# Patient Record
Sex: MALE | Race: WHITE | Employment: OTHER | URBAN - METROPOLITAN AREA
[De-identification: names, ages, dates, MRNs, and addresses within clinical notes are randomized per-mention and may not be internally consistent; named-entity substitution may affect disease eponyms.]

---

## 2020-07-17 ENCOUNTER — HOSPITAL ENCOUNTER (EMERGENCY)
Age: 85
Discharge: HOME | End: 2020-07-17
Attending: EMERGENCY MEDICINE
Payer: MEDICARE

## 2020-07-17 VITALS
OXYGEN SATURATION: 96 % | HEART RATE: 74 BPM | WEIGHT: 195 LBS | RESPIRATION RATE: 22 BRPM | TEMPERATURE: 97.9 F | SYSTOLIC BLOOD PRESSURE: 160 MMHG | DIASTOLIC BLOOD PRESSURE: 90 MMHG | HEIGHT: 68 IN | BODY MASS INDEX: 29.55 KG/M2

## 2020-07-17 DIAGNOSIS — R31.0 GROSS HEMATURIA: Primary | ICD-10-CM

## 2020-07-17 DIAGNOSIS — N30.90 CYSTITIS: ICD-10-CM

## 2020-07-17 LAB
APPEARANCE UR: ABNORMAL
BACTERIA URNS QL MICRO: ABNORMAL /HPF
BILIRUB UR QL: ABNORMAL
CASTS URNS QL MICRO: ABNORMAL /LPF
COLOR UR: ABNORMAL
EPI CELLS #/AREA URNS HPF: ABNORMAL /HPF
GLUCOSE UR STRIP.AUTO-MCNC: NEGATIVE MG/DL
HGB UR QL STRIP: ABNORMAL
KETONES UR QL STRIP.AUTO: 40 MG/DL
LEUKOCYTE ESTERASE UR QL STRIP.AUTO: ABNORMAL
NITRITE UR QL STRIP.AUTO: POSITIVE
PH UR STRIP: 5 [PH] (ref 5–9)
PROT UR STRIP-MCNC: 300 MG/DL
RBC #/AREA URNS HPF: >100 /HPF
SP GR UR REFRACTOMETRY: 1.02 (ref 1–1.02)
UROBILINOGEN UR QL STRIP.AUTO: 1 EU/DL (ref 0.2–1)
WBC URNS QL MICRO: ABNORMAL /HPF

## 2020-07-17 PROCEDURE — 99283 EMERGENCY DEPT VISIT LOW MDM: CPT

## 2020-07-17 PROCEDURE — 74011250637 HC RX REV CODE- 250/637: Performed by: EMERGENCY MEDICINE

## 2020-07-17 PROCEDURE — 81001 URINALYSIS AUTO W/SCOPE: CPT

## 2020-07-17 RX ORDER — CEFPODOXIME PROXETIL 200 MG/1
200 TABLET, FILM COATED ORAL EVERY 12 HOURS
Status: DISCONTINUED | OUTPATIENT
Start: 2020-07-17 | End: 2020-07-17 | Stop reason: HOSPADM

## 2020-07-17 RX ORDER — CEFPODOXIME PROXETIL 100 MG/1
100 TABLET, FILM COATED ORAL 2 TIMES DAILY
Qty: 20 TAB | Refills: 0 | Status: SHIPPED | OUTPATIENT
Start: 2020-07-17 | End: 2020-07-27

## 2020-07-17 RX ORDER — LIDOCAINE HYDROCHLORIDE 20 MG/ML
JELLY TOPICAL ONCE
Status: DISCONTINUED | OUTPATIENT
Start: 2020-07-17 | End: 2020-07-17 | Stop reason: HOSPADM

## 2020-07-17 RX ORDER — LIDOCAINE HYDROCHLORIDE 20 MG/ML
JELLY TOPICAL
Status: DISCONTINUED
Start: 2020-07-17 | End: 2020-07-17 | Stop reason: HOSPADM

## 2020-07-17 RX ADMIN — CEFPODOXIME PROXETIL 200 MG: 200 TABLET, FILM COATED ORAL at 18:48

## 2020-07-17 NOTE — Clinical Note
Melva Galindo was seen and treated in our emergency department on 7/17/2020. He may return to work on .

## 2020-07-17 NOTE — ED TRIAGE NOTES
Patient arrives with complaint of penile pain with bleeding. This started two hours ago when he noticed blood in his urine. He states that the pain started at that time as well. Patient has had prostate problems in the past that were treated with antibiotics, but he states that this has never happed before. He reports also having a penile implant.

## 2020-07-17 NOTE — ED PROVIDER NOTES
[facility] Emergency Department  Arrival Date/Time: 7/17/2020 @ 30 Cici Sherman  MRN: [id number]      65 y.o. male    YOB: 1935   Telephone Information:   Mobile [phone] (home)     [facility] EMERGENCY DEPT FT11/11  Seen on 7/17/2020 @ 6:01 PM       Chief Complaint   Patient presents with    Penis Pain     HPI: 63-year-old male history of prostate problems and penile implant presents to the emergency department with hematuria    Patient states he first noticed it a few hours ago. Is happened several times since onset    No pain. No suprapubic discomfort or distention. He is able to urinate without difficulty    No fever no chills. He is not tachycardic. Blood pressure slightly elevated at 163/93 but not alarmingly so       HPI    Historian: patient    Review of Systems: .addros     . zaddrostable   No fever  Const  nontoxic well-appearing   Resp  no cough or shortness of breath   CV  no palpitations   GI  no nausea or vomiting     see HPI   Skin  warm dry     Allergies: Allergies   Allergen Reactions    Sulfa (Sulfonamide Antibiotics) Other (comments)     \"puts me out\"         Key Anti-Platelet Anticoagulant Meds     The patient is on no antiplatelet meds or anticoagulants. Physical Exam:  Nursing documentation reviewed. Vitals:    07/17/20 1730 07/17/20 1733   BP: 163/69 (!) 163/93   Pulse:  72   Resp:  24   Temp:  97.9 °F (36.6 °C)   SpO2:  96%    Vital signs were reviewed. Physical Exam  Vitals signs and nursing note reviewed. Constitutional:       General: He is not in acute distress. Appearance: Normal appearance. HENT:      Head: Normocephalic. Cardiovascular:      Rate and Rhythm: Normal rate. Abdominal:      General: There is no distension. Tenderness: There is no abdominal tenderness. There is no guarding.    Genitourinary:     Penis: Normal.    Neurological:      Mental Status: He is alert and oriented to person, place, and time. MEDICAL DECISION MAKING:     This is a new problem that does need additional workup  Labs/Radiographs/ECG were ordered: yes  Old records were reviewed: yes  CT/US/XRay/MRI were visualized by me: not ordered    The patient's problem is: moderate  The Diagnostic Options are: minimal risk  The Management Options are: moderate risk     Data:    Lab findings during this visit (only abnormal values will be noted):   Labs Reviewed   URINALYSIS W/ RFLX MICROSCOPIC - Abnormal; Notable for the following components:       Result Value    Specific gravity 1.025 (*)     Protein 300 (*)     Ketone 40 (*)     Bilirubin LARGE (*)     Blood LARGE (*)     Nitrites Positive (*)     Leukocyte Esterase LARGE (*)     RBC >100 (*)     Bacteria 4+ (*)     All other components within normal limits      Radiology studies during this visit: No results found. Medications given in the ED:   Medications   lidocaine (URO-JET/ GLYDO) 2 % jelly (has no administration in time range)   lidocaine (URO-JET/ GLYDO) 2 % jelly (has no administration in time range)   cefpodoxime (VANTIN) tablet 200 mg (has no administration in time range)        Recheck:   Offered Prakash catheter placement. Patient deferred requesting we obtain urinalysis first.  This time there is no signs of urinary retention    His urine does show large leuks positive nitrates 4+ bacteria. We will send for culture    He is not febrile tachycardic tachypneic or ill-appearing. No signs or symptoms of sepsis or septic shock. Started on antibiotics. Discharge home. Other ED Course Notes:        I wore appropriate PPE throughout this patient's ED encounter. Procedure Documentation: Procedures     Assessment and Plan:    Impression:     ICD-10-CM ICD-9-CM   1. Gross hematuria  R31.0 599.71   2.  Cystitis  N30.90 595.9      Condition at Discharge: stable  Disposition: home  Follow-up:   Follow-up Information     Follow up With Specialties Details Why [address],[facility] EMERGENCY DEPT Emergency Medicine  come back if you get worse or have any new problems [address] [place] [facility] [id number] [phone]         Discharge Medications:   Current Discharge Medication List      START taking these medications    Details   cefpodoxime (VANTIN) 100 mg tablet Take 1 Tab by mouth two (2) times a day for 10 days. Qty: 20 Tab, Refills: 0              No discharge date for patient encounter. Past Medical History: Primary Care Doctor: Emre, Not On File   No past medical history on file. No past surgical history on file.   Social History     Tobacco Use    Smoking status: Not on file   Substance Use Topics    Alcohol use: Not on file    Drug use: Not on file      Home Medication:   None

## 2020-07-17 NOTE — ED NOTES
I have reviewed discharge instructions with the patient. The patient verbalized understanding. Patient left ED via Discharge Method: ambulatory to Home with self. Opportunity for questions and clarification provided. Patient given 1 scripts. To continue your aftercare when you leave the hospital, you may receive an automated call from our care team to check in on how you are doing. This is a free service and part of our promise to provide the best care and service to meet your aftercare needs.  If you have questions, or wish to unsubscribe from this service please call [phone]. Thank you for Choosing our [address] [place] [address] Emergency Department.